# Patient Record
Sex: FEMALE | URBAN - METROPOLITAN AREA
[De-identification: names, ages, dates, MRNs, and addresses within clinical notes are randomized per-mention and may not be internally consistent; named-entity substitution may affect disease eponyms.]

---

## 2024-11-19 ENCOUNTER — OFFICE VISIT (OUTPATIENT)
Age: 19
End: 2024-11-19

## 2024-11-19 VITALS
OXYGEN SATURATION: 98 % | HEART RATE: 86 BPM | BODY MASS INDEX: 21.69 KG/M2 | TEMPERATURE: 98.1 F | SYSTOLIC BLOOD PRESSURE: 119 MMHG | WEIGHT: 135 LBS | HEIGHT: 66 IN | RESPIRATION RATE: 18 BRPM | DIASTOLIC BLOOD PRESSURE: 79 MMHG

## 2024-11-19 DIAGNOSIS — B86 SCABIES: Primary | ICD-10-CM

## 2024-11-19 RX ORDER — HYDROXYZINE HYDROCHLORIDE 25 MG/1
25 TABLET, FILM COATED ORAL EVERY 8 HOURS PRN
Qty: 30 TABLET | Refills: 0 | Status: SHIPPED | OUTPATIENT
Start: 2024-11-19 | End: 2024-11-29

## 2024-11-19 RX ORDER — PREDNISONE 20 MG/1
20 TABLET ORAL 2 TIMES DAILY
Qty: 10 TABLET | Refills: 0 | Status: SHIPPED | OUTPATIENT
Start: 2024-11-19 | End: 2024-11-24

## 2024-11-19 RX ORDER — PERMETHRIN 50 MG/G
CREAM TOPICAL
Qty: 60 G | Refills: 1 | Status: SHIPPED | OUTPATIENT
Start: 2024-11-19

## 2024-11-19 ASSESSMENT — ENCOUNTER SYMPTOMS
VOMITING: 0
EYE PAIN: 0
DIARRHEA: 0
COUGH: 0
SORE THROAT: 0
SHORTNESS OF BREATH: 0
RHINORRHEA: 0

## 2024-11-19 NOTE — PROGRESS NOTES
Rate and Rhythm: Normal rate and regular rhythm.   Pulmonary:      Effort: Pulmonary effort is normal. No respiratory distress.      Breath sounds: Normal breath sounds.   Musculoskeletal:      Cervical back: Neck supple. No rigidity.   Lymphadenopathy:      Cervical: No cervical adenopathy.   Skin:     Findings: Rash (scattered tunneled rash/scabies) present.   Neurological:      General: No focal deficit present.      Mental Status: She is alert.           An electronic signature was used to authenticate this note.    --AMARILIS CARRINGTON MD

## 2025-02-10 ENCOUNTER — OFFICE VISIT (OUTPATIENT)
Age: 20
End: 2025-02-10

## 2025-02-10 VITALS
HEIGHT: 66 IN | HEART RATE: 111 BPM | OXYGEN SATURATION: 96 % | SYSTOLIC BLOOD PRESSURE: 116 MMHG | BODY MASS INDEX: 21.89 KG/M2 | TEMPERATURE: 100.1 F | DIASTOLIC BLOOD PRESSURE: 82 MMHG | WEIGHT: 136.2 LBS

## 2025-02-10 DIAGNOSIS — R09.82 POST-NASAL DRIP: Primary | ICD-10-CM

## 2025-02-10 DIAGNOSIS — R05.1 ACUTE COUGH: ICD-10-CM

## 2025-02-10 LAB
INFLUENZA VIRUS A RNA: NEGATIVE
INFLUENZA VIRUS B RNA: NEGATIVE

## 2025-02-10 ASSESSMENT — ENCOUNTER SYMPTOMS
SORE THROAT: 1
COUGH: 1
SINUS PRESSURE: 1
SINUS PAIN: 1

## 2025-02-11 NOTE — PROGRESS NOTES
Shanice Orozco (:  2005) is a 19 y.o. female,Established patient, here for evaluation of the following chief complaint(s):  Cough (Pt c/o sore throat, cough x 2 days)      ASSESSMENT/PLAN:    ICD-10-CM    1. Post-nasal drip  R09.82       2. Acute cough  R05.1 POCT Influenza A/B DNA        Results for orders placed or performed in visit on 02/10/25   POCT Influenza A/B DNA   Result Value Ref Range    Influenza virus A RNA negative     Influenza virus B RNA negative    Post nasal drip and acute cough  Cetirizine daily  Flonase nasal spray  Azelastine Astepro two times a day  Acetaminophen/ibuprofen for pain/fever    Follow up in 7 days if symptoms persist or if symptoms worsen.  Patient verbalized understanding of printed and verbal discharge instructions including follow up care.   Follow up with your primary care provider for persistent symptoms.   SUBJECTIVE/OBJECTIVE:    History provided by:  Patient  Cough  This is a new problem. The cough is Non-productive. Associated symptoms include headaches and a sore throat. Pertinent negatives include no ear pain, fever or myalgias. She has tried nothing for the symptoms. There is no history of asthma.           Vitals:    02/10/25 1944   BP: 116/82   Site: Right Upper Arm   Position: Sitting   Cuff Size: Medium Adult   Pulse: (!) 111   Temp: 100.1 °F (37.8 °C)   TempSrc: Oral   SpO2: 96%   Weight: 61.8 kg (136 lb 3.2 oz)   Height: 1.676 m (5' 6\")       Review of Systems   Constitutional:  Positive for fatigue. Negative for fever.   HENT:  Positive for sinus pressure, sinus pain and sore throat. Negative for congestion, ear discharge and ear pain.    Respiratory:  Positive for cough.    Musculoskeletal:  Negative for myalgias.   Neurological:  Positive for headaches.       Physical Exam  Vitals and nursing note reviewed.   Constitutional:       Appearance: Normal appearance.   HENT:      Right Ear: Tympanic membrane, ear canal and external ear normal.      Left Ear: